# Patient Record
Sex: FEMALE | Race: WHITE | HISPANIC OR LATINO | URBAN - METROPOLITAN AREA
[De-identification: names, ages, dates, MRNs, and addresses within clinical notes are randomized per-mention and may not be internally consistent; named-entity substitution may affect disease eponyms.]

---

## 2023-08-21 ENCOUNTER — ANCILLARY PROCEDURE (OUTPATIENT)
Dept: GENERAL RADIOLOGY | Facility: CLINIC | Age: 63
End: 2023-08-21
Attending: PHYSICIAN ASSISTANT

## 2023-08-21 ENCOUNTER — OFFICE VISIT (OUTPATIENT)
Dept: URGENT CARE | Facility: URGENT CARE | Age: 63
End: 2023-08-21

## 2023-08-21 VITALS
TEMPERATURE: 97.1 F | HEART RATE: 86 BPM | OXYGEN SATURATION: 95 % | WEIGHT: 145.9 LBS | RESPIRATION RATE: 16 BRPM | SYSTOLIC BLOOD PRESSURE: 162 MMHG | DIASTOLIC BLOOD PRESSURE: 77 MMHG

## 2023-08-21 DIAGNOSIS — R06.2 WHEEZING: ICD-10-CM

## 2023-08-21 DIAGNOSIS — J40 BRONCHITIS: ICD-10-CM

## 2023-08-21 DIAGNOSIS — R05.1 ACUTE COUGH: Primary | ICD-10-CM

## 2023-08-21 DIAGNOSIS — R05.1 ACUTE COUGH: ICD-10-CM

## 2023-08-21 PROCEDURE — 99203 OFFICE O/P NEW LOW 30 MIN: CPT | Performed by: PHYSICIAN ASSISTANT

## 2023-08-21 PROCEDURE — 71046 X-RAY EXAM CHEST 2 VIEWS: CPT | Mod: TC | Performed by: RADIOLOGY

## 2023-08-21 RX ORDER — AZITHROMYCIN 250 MG/1
TABLET, FILM COATED ORAL
Qty: 6 TABLET | Refills: 0 | Status: SHIPPED | OUTPATIENT
Start: 2023-08-21 | End: 2023-08-26

## 2023-08-21 RX ORDER — PREDNISONE 20 MG/1
40 TABLET ORAL DAILY
Qty: 10 TABLET | Refills: 0 | Status: SHIPPED | OUTPATIENT
Start: 2023-08-21 | End: 2023-08-26

## 2023-08-21 RX ORDER — ALBUTEROL SULFATE 90 UG/1
2 AEROSOL, METERED RESPIRATORY (INHALATION) EVERY 4 HOURS PRN
Qty: 18 G | Refills: 1 | Status: SHIPPED | OUTPATIENT
Start: 2023-08-21

## 2023-08-21 NOTE — PROGRESS NOTES
Assessment & Plan     Acute cough  Pt seen for 2 week hx of cough, wheezing, change of sputum.  Her CXR is uremarkable, no pna, effusion, infiltrate, ptx, or cardiomegaly. She was treated for bronchitis given duration of sx with zithromax and given wheezing given prednisone and albuterol.  If sx persist or do not improve should recheck. There may be a component of RAD secondary to exposure to irritant smoke/with the plastic fumes, but sats are nromal , no tachypnea, and albuterol and prednisone should be helpful if it was related to that.    She may follow-up prn.    - XR Chest 2 Views  - azithromycin (ZITHROMAX) 250 MG tablet  Dispense: 6 tablet; Refill: 0  - predniSONE (DELTASONE) 20 MG tablet  Dispense: 10 tablet; Refill: 0    Wheezing  As above   - azithromycin (ZITHROMAX) 250 MG tablet  Dispense: 6 tablet; Refill: 0  - predniSONE (DELTASONE) 20 MG tablet  Dispense: 10 tablet; Refill: 0  - albuterol (PROAIR HFA/PROVENTIL HFA/VENTOLIN HFA) 108 (90 Base) MCG/ACT inhaler  Dispense: 18 g; Refill: 1    Bronchitis  As above   - azithromycin (ZITHROMAX) 250 MG tablet  Dispense: 6 tablet; Refill: 0  - predniSONE (DELTASONE) 20 MG tablet  Dispense: 10 tablet; Refill: 0         Zenia Cortes PA-C  [facility] URGENT [facility]    Fito Cleveland is a 63 year old female who presents to clinic today for the following health issues:  Chief Complaint   Patient presents with    Urgent Care    Breathing Problem     Using ipad for -pt states was at work and there was a plastic got burn and feel the burn down throat and in her saliva, last night have trouble breathing, have a lot of cough, this morning was brushing her teeth and think sunday to deep and causing her to cough (tried honey and tea but no help)    Cough     HPI: pt seen with assistance of [place] medical interpretor.    Difficulty breathing, cough at night and daytime. 2 weeks ago.  Involves the throat discomfort and chest  only.  Cough primarily. Not a burning sensation.    Large amounts of phlegm: first clear, the last 3 days green color.    Albuterol nebulizer :  grand daughter's machine. Used it and did not get much help but did get shaky.    Feels wheezy.  No SOB at rest, but feels breathing is different.    No fevers.   No nausea, vomiting.    No hx of asthma, COPD.   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  Wears a mask to decrease inhalation of the smoke.    Has been doing this for a few weeks (new job)  .                Review of Systems  Constitutional, HEENT, cardiovascular, pulmonary, gi and gu systems are negative, except as otherwise noted.      Objective    BP (!) 162/77 (BP Location: Left arm, Patient Position: Sitting, Cuff Size: Adult Regular)   Pulse 86   Temp 97.1  F (36.2  C) (Tympanic)   Resp 16   Wt 66.2 kg (145 lb 14.4 oz)   SpO2 95%   Physical Exam   Pt is in no acute distress and appears well  Ears patent B:  TM s intact, non-injected. All land marks easily visibile    Nasal mucosa is non-edematous, no discharge.    Pharynx: non erythematous, tonsils non hypertrophied, No exudate   Neck supple: no adenopathy  Lungs: CTA  Heart: RRR, no murmur, no thrills or heaves   Ext: no edema  Skin: no rashes      Results for orders placed or performed in visit on 08/21/23   XR Chest 2 Views     Status: None    Narrative    XR CHEST 2 VIEWS 8/21/2023 1:14 PM    HISTORY: Acute cough    COMPARISON: None.      Impression    IMPRESSION: No acute cardiopulmonary process.    ROGER CHAIDEZ MD         SYSTEM ID:  [id number]